# Patient Record
Sex: FEMALE | HISPANIC OR LATINO | URBAN - METROPOLITAN AREA
[De-identification: names, ages, dates, MRNs, and addresses within clinical notes are randomized per-mention and may not be internally consistent; named-entity substitution may affect disease eponyms.]

---
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
## 2022-11-08 NOTE — ED TRIAGE NOTES
Grace Wang 17 y.o. [facility] for   Chief Complaint   Patient presents with   • Painful Urination     x1 month    • Abdominal Cramping     lower abd x1 month, worsening the past two weeks    • Nausea     /69   Pulse 90   Temp 36.8 °C (98.3 °F)   Resp 18   LMP 07/18/2018 (Exact Date)   SpO2 100%     Pt reports she had a miscarriage in May and that today is the first day of her normal menstrual periods. Pt reports for the past month she has been having painful urination and her urine has had a different smell. She reports that she has also been having lower abd pain for the past month that increased in the past two weeks. She also reports nausea. Pt denies fevers.     Pt given supplies for urine sample. Pt denied offer for zofran.     Pt and and family to WR, informed of triage process and to notify RN of any changes or concerns.   
[Takes medication as prescribed] : takes